# Patient Record
Sex: FEMALE | Race: WHITE | NOT HISPANIC OR LATINO | Employment: UNEMPLOYED | URBAN - METROPOLITAN AREA
[De-identification: names, ages, dates, MRNs, and addresses within clinical notes are randomized per-mention and may not be internally consistent; named-entity substitution may affect disease eponyms.]

---

## 2017-05-04 ENCOUNTER — APPOINTMENT (OUTPATIENT)
Dept: WOMENS IMAGING | Facility: HOSPITAL | Age: 42
End: 2017-05-04

## 2017-05-04 PROCEDURE — 77067 SCR MAMMO BI INCL CAD: CPT | Performed by: RADIOLOGY

## 2017-05-10 ENCOUNTER — APPOINTMENT (OUTPATIENT)
Dept: WOMENS IMAGING | Facility: HOSPITAL | Age: 42
End: 2017-05-10

## 2017-05-10 PROCEDURE — 77066 DX MAMMO INCL CAD BI: CPT | Performed by: RADIOLOGY

## 2017-11-15 ENCOUNTER — APPOINTMENT (OUTPATIENT)
Dept: WOMENS IMAGING | Facility: HOSPITAL | Age: 42
End: 2017-11-15

## 2017-11-15 PROCEDURE — 77066 DX MAMMO INCL CAD BI: CPT | Performed by: RADIOLOGY

## 2017-11-15 PROCEDURE — 77062 BREAST TOMOSYNTHESIS BI: CPT | Performed by: RADIOLOGY

## 2018-05-22 ENCOUNTER — APPOINTMENT (OUTPATIENT)
Dept: WOMENS IMAGING | Facility: HOSPITAL | Age: 43
End: 2018-05-22

## 2018-05-22 PROCEDURE — 77067 SCR MAMMO BI INCL CAD: CPT | Performed by: RADIOLOGY

## 2018-05-22 PROCEDURE — 77063 BREAST TOMOSYNTHESIS BI: CPT | Performed by: RADIOLOGY

## 2019-05-23 ENCOUNTER — APPOINTMENT (OUTPATIENT)
Dept: WOMENS IMAGING | Facility: HOSPITAL | Age: 44
End: 2019-05-23

## 2019-05-23 PROCEDURE — 77067 SCR MAMMO BI INCL CAD: CPT | Performed by: RADIOLOGY

## 2019-05-23 PROCEDURE — 77063 BREAST TOMOSYNTHESIS BI: CPT | Performed by: RADIOLOGY

## 2019-06-05 ENCOUNTER — APPOINTMENT (OUTPATIENT)
Dept: WOMENS IMAGING | Facility: HOSPITAL | Age: 44
End: 2019-06-05

## 2019-06-05 PROCEDURE — G0279 TOMOSYNTHESIS, MAMMO: HCPCS | Performed by: RADIOLOGY

## 2019-06-05 PROCEDURE — 77065 DX MAMMO INCL CAD UNI: CPT | Performed by: RADIOLOGY

## 2019-06-05 PROCEDURE — 77061 BREAST TOMOSYNTHESIS UNI: CPT | Performed by: RADIOLOGY

## 2019-06-05 PROCEDURE — 76641 ULTRASOUND BREAST COMPLETE: CPT | Performed by: RADIOLOGY

## 2019-06-12 ENCOUNTER — APPOINTMENT (OUTPATIENT)
Dept: WOMENS IMAGING | Facility: HOSPITAL | Age: 44
End: 2019-06-12

## 2019-06-12 PROCEDURE — 19081 BX BREAST 1ST LESION STRTCTC: CPT | Performed by: RADIOLOGY

## 2019-06-17 ENCOUNTER — TELEPHONE (OUTPATIENT)
Dept: SURGERY | Facility: CLINIC | Age: 44
End: 2019-06-17

## 2019-06-17 NOTE — TELEPHONE ENCOUNTER
New patient appointment with Dr. Huff is scheduled on 7/3/2019 @ 11:00am.    Called and spoke to patient, patient expressed verbal understanding of appointment time.    Sent patient a reminder letter in the mail.

## 2019-07-03 ENCOUNTER — OFFICE VISIT (OUTPATIENT)
Dept: SURGERY | Facility: CLINIC | Age: 44
End: 2019-07-03

## 2019-07-03 ENCOUNTER — PREP FOR SURGERY (OUTPATIENT)
Dept: OTHER | Facility: HOSPITAL | Age: 44
End: 2019-07-03

## 2019-07-03 VITALS
WEIGHT: 148 LBS | HEART RATE: 80 BPM | OXYGEN SATURATION: 99 % | HEIGHT: 66 IN | SYSTOLIC BLOOD PRESSURE: 106 MMHG | DIASTOLIC BLOOD PRESSURE: 68 MMHG | BODY MASS INDEX: 23.78 KG/M2

## 2019-07-03 DIAGNOSIS — R89.7 ABNORMAL BREAST BIOPSY: Primary | ICD-10-CM

## 2019-07-03 PROCEDURE — 99243 OFF/OP CNSLTJ NEW/EST LOW 30: CPT | Performed by: SURGERY

## 2019-07-03 RX ORDER — CEFAZOLIN SODIUM 2 G/100ML
2 INJECTION, SOLUTION INTRAVENOUS ONCE
Status: CANCELLED | OUTPATIENT
Start: 2019-08-19 | End: 2019-07-03

## 2019-07-03 RX ORDER — VALACYCLOVIR HYDROCHLORIDE 500 MG/1
500 TABLET, FILM COATED ORAL DAILY
COMMUNITY
Start: 2019-06-27

## 2019-07-03 NOTE — PROGRESS NOTES
[facility]     Referring Provider: Becki Zaldivar MD     Chief complaint: Abnormal left breast biopsy     HPI: Ms. Jade Reyes is a 43 yo woman, seen at the request of Dr. 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 This was initially detected as an imaging abnormality on routine screening. Her work-up is detailed in the breast history section below. She denies any associated breast lumps, pain, skin changes, or nipple discharge. She denies any prior history of breast problems or breast biopsies. She denies any family history of breast or ovarian cancer. She was joined today in clinic by her  Alvaro. She gave consent for her  to be present during her examination and participate in the discussion.       I personally reviewed her records and summarized her relevant breast history/imagin/4/17, Baseline Screening MMG ([facility]):  There are scattered areas of fibroglandular density.   1. There is a small, oval mass measuring 5 millimeters with obscured margins seen in the middle one-third upper outer region of the right breast located 5 centimeters from the nipple.  2. There is a focal asymmetry measuring 10 millimeters seen in the posterior one-third of the left breast at 9 o’clock, central located 5 centimeters from the nipple.   BI-RADS 0: Incomplete.    5/10/17, Bilateral Diagnostic MMG ([facility]):  1. On the present examination, there is a small oval mass measuring 6 millimeters with circumscribed margins in the middle one-third upper outer region of the right breast located 5 centimeters from the nipple. Mass has become more defined. This finding is most consistent with an intramammary lymph node.  2. On the present examination, there is an area of asymmetric breast tissue in the posterior one-third of the left asymmetry noted on the recent screening mammogram resolves into stroma on additional  views. No suspicious masses, or architectural distortions are identified.  BI-RADS 3: Probably Benign. Follow-up MMG in 6 months is recommended.     11/15/17, Bilateral Diagnostic MMG with Andrew ([facility]):  There are scattered areas of fibroglandular density. There are no suspicious masses, significant calcifications, or other abnormalities seen in either breast. A nonenlarged fat containing intramammary lymph node is again seen in right lateral breast (best visualized on CC views), a benign finding.  BI-RADS 2: Benign.     18, Screening MMG with Andrew ([facility]):  There are scattered areas of fibroglandular density. No suspicious masses, significant calcifications or other abnormalities are seen.   BI-RADS 1: Negative.    19, Screening MMG with Andrew ([facility]):  There are scattered areas of fibroglandular density. There is focal asymmetry measuring 15 millimeters seen in the central region of the left breast located 6 centimeters from the nipple. This is best seen on the CC view.  In the right breast, no suspicious masses, significant calcifications or other abnormalities are seen.  BI-RADS 0: Incomplete.    19, Left Diagnostic MMG with Andrew & Left Breast US ([facility]).  MM. On the present examination, there is focal asymmetry measuring 16 millimeters with associated very faint calcifications and architectural distortion in the central region of the left breast located 6 centimeters from the nipple. This persists on the additional images and requires further evaluation.  US:  1. There is no evidence of any solid mass or abnormal cystic elements. No sonographic correlate could be identified.  2. There is a small simple cyst measuring 5 millimeters seen in the sub-areolar region of the left breast.   3. There is an area of duct ectasia seen in the sub-areolar region of the left breast.   IMPRESSION:  1. Focal asymmetry in the left breast is suspicious. Stereotactic biopsy is  recommended.  2. Simple cyst in the sub-areolar region of the left breast is benign-negative.  3. Area of duct ectasia in the sub-areolar region of the left breast is benign-negative.  BI-RADS 4: Suspicious.    6/12/19, Left Breast, Stereotactic Biopsy ([facility]):   1. Breast, Left Central, Core Needle Biopsy:  Radial Sclerosing Lesion with Associated Usual Ductal Hyperplasia, Columnar Cell Lesion Without Atypia (Columnar Cell Change/Hyperplasia), Micropapillomas, Apocrine Metaplasia, and Ectatic Ducts/Microcysts.  Microcalcifications Associated with Benign Mammary Ducts/Lobules.      Review of Systems   Constitutional: Negative for appetite change, chills, diaphoresis, fatigue, fever and unexpected weight change.   HENT:   Negative for hearing loss, lump/mass, mouth sores, nosebleeds, sore throat, tinnitus, trouble swallowing and voice change.    Eyes: Negative for eye problems and icterus.   Respiratory: Negative for chest tightness, cough, hemoptysis, shortness of breath and wheezing.    Cardiovascular: Negative for chest pain, leg swelling and palpitations.   Gastrointestinal: Negative for abdominal distention, abdominal pain, blood in stool, constipation, diarrhea, nausea, rectal pain and vomiting.   Endocrine: Negative for hot flashes.   Genitourinary: Negative for bladder incontinence, difficulty urinating, dyspareunia, dysuria, frequency, hematuria, menstrual problem, nocturia, pelvic pain, vaginal bleeding and vaginal discharge.    Musculoskeletal: Negative for arthralgias, back pain, flank pain, gait problem, myalgias, neck pain and neck stiffness.   Skin: Negative for itching, rash and wound.   Neurological: Negative for dizziness, extremity weakness, gait problem, headaches, light-headedness, numbness, seizures and speech difficulty.   Hematological: Negative for adenopathy. Does not bruise/bleed easily.   Psychiatric/Behavioral: Negative for confusion, decreased concentration, depression, sleep disturbance  and suicidal ideas. The patient is not nervous/anxious.    I personally reviewed and updated the ROS.      Medications:    Current Outpatient Medications:   •  MULTIPLE VITAMIN PO, Take  by mouth., Disp: , Rfl:   •  valACYclovir (VALTREX) 500 MG tablet, , Disp: , Rfl:       Allergies   Allergen Reactions   • Benzonatate Rash and Swelling   • Latex Rash   • Sulfa Antibiotics Rash       Medical history:  None      Past Surgical History:   Procedure Laterality Date   • WISDOM TOOTH EXTRACTION         Family History   Problem Relation Age of Onset   • Melanoma Father         60's   • Melanoma Maternal Grandfather         60's   • Colon cancer Paternal Grandfather         60's       Social History     Socioeconomic History   • Marital status:      Spouse name: Alvaro   • Number of children: 2   • Years of education: Not on file   • Highest education level: Not on file   Occupational History   • Occupation:      Comment: [employer],    Tobacco Use   • Smoking status: Never Smoker   • Smokeless tobacco: Never Used   Substance and Sexual Activity   • Alcohol use: No     Frequency: Never   • Drug use: No   Social History Narrative    1 daughter, 1 son     Patient drinks 2 servings of caffeine per day.       GYNECOLOGIC HISTORY:   . P: 2. AB: 0.  Last menstrual period: 19  Age at menarche: 15  Age at first childbirth: 31  Lactation/How lon month  Age at menopause: premenopausal  Total years of oral contraceptive use: 6  Total years of hormone replacement therapy: none      Physical Exam  Vitals:    19 1105   BP: 106/68   Pulse: 80   SpO2: 99%     ECOG 0 - Asymptomatic  General: NAD, well appearing  Psych: a&o x 3, normal mood and affect  Eyes: EOMI, no scleral icterus  ENMT: neck supple without masses or thyromegaly, mucus membranes moist  Resp: normal effort, CTAB  CV: RRR, no murmurs, no edema   GI: soft, NT, ND  MSK: normal gait, normal ROM in bilateral shoulders  Lymph nodes:  no cervical, supraclavicular or axillary lymphadenopathy  Breast: symmetric, large size, grade 2 ptosis  Right: No visible abnormalities on inspection while seated, with arms raised or hands on hips. No masses, skin changes, or nipple abnormalities.  Left: There is a medial biopsy site, otherwise no visible abnormalities on inspection while seated, with arms raised or hands on hips. No masses or nipple abnormalities.    Left breast, in-office ultrasound: At 9:30, 5 cm FN, there are vague postbiopsy changes with a small resolving postbiopsy hematoma seen adjacent to the pectoralis muscle.    I have independently reviewed her imaging and here are my findings:   In the medial/central left breast, there was initially a 1.6 cm asymmetry/area of distortion seen on mammogram.      Assessment:  44 y.o. 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    Discussion:  We reviewed her pathology and imaging reports. We discussed that the finding of a complex sclerosing lesion on core biopsy is associated with an approximately 15-20% risk of identifying atypia, DCIS, or invasive carcinoma within the vicinity of the lesion. Similarly, a papilloma is associated with an approximately 15% risk of upgrade. This cannot be recognized on the small volume of tissue obtained at percutaneous biopsy. Therefore excisional biopsy with more generous tissue sampling and pathologic analysis is recommended. She is in agreement with this plan.     We discussed that excisional biopsy would require pre-operative wire-localization, as this was a mammographically detected lesion. We discussed that should the final pathology be upgraded, she would likely require an additional operation. We reviewed additional risks and potential complications associated with surgery, including, but not limited to, bleeding, infection, deformity or poor cosmetic result, chronic pain, numbness, seroma, hematoma, loss of nipple  sensation, nipple retraction, deep venous thrombosis, and skin flap necrosis. We reviewed postoperative wound care and activity restrictions. She expressed an understanding of these factors and wished to proceed.    Plan:  -Left needle-localized excisional biopsy. She would like to schedule this at the end of August.    Salome Huff MD    I have spent 45 min in face to face time with the patient and 35 min of this time was spent in counseling on the above stated issues.       CC:  MD Anjelica Mosqueda MD Mollie Cartwright, MD

## 2019-07-05 ENCOUNTER — TRANSCRIBE ORDERS (OUTPATIENT)
Dept: SURGERY | Facility: CLINIC | Age: 44
End: 2019-07-05

## 2019-07-05 DIAGNOSIS — R89.7 NONSPECIFIC ABNORMAL HISTOLOGICAL FINDINGS: Primary | ICD-10-CM

## 2019-07-08 ENCOUNTER — TELEPHONE (OUTPATIENT)
Dept: SURGERY | Facility: CLINIC | Age: 44
End: 2019-07-08

## 2019-07-08 NOTE — TELEPHONE ENCOUNTER
Surgery is scheduled on 8/19/2019 @ 12:30pm, NL @ 11:00, arrival 9:00am.    PAT is scheduled on 8/12/2019 @ 8:30am.    Post-op appointment is scheduled on 8/30/2019 @ 10:30am.    Called and spoke to patient, patient expressed verbal understanding of appointment times.    Sent patient a reminder letter in the mail.

## 2019-08-12 ENCOUNTER — APPOINTMENT (OUTPATIENT)
Dept: PREADMISSION TESTING | Facility: HOSPITAL | Age: 44
End: 2019-08-12

## 2019-08-12 VITALS
BODY MASS INDEX: 23.65 KG/M2 | RESPIRATION RATE: 16 BRPM | HEART RATE: 70 BPM | HEIGHT: 67 IN | SYSTOLIC BLOOD PRESSURE: 108 MMHG | WEIGHT: 150.7 LBS | DIASTOLIC BLOOD PRESSURE: 72 MMHG | TEMPERATURE: 98.8 F | OXYGEN SATURATION: 100 %

## 2019-08-12 DIAGNOSIS — R89.7 ABNORMAL BREAST BIOPSY: ICD-10-CM

## 2019-08-12 LAB
ANION GAP SERPL CALCULATED.3IONS-SCNC: 8.5 MMOL/L (ref 5–15)
BASOPHILS # BLD AUTO: 0.05 10*3/MM3 (ref 0–0.2)
BASOPHILS NFR BLD AUTO: 1 % (ref 0–1.5)
BUN BLD-MCNC: 10 MG/DL (ref 6–20)
BUN/CREAT SERPL: 13.3 (ref 7–25)
CALCIUM SPEC-SCNC: 8.8 MG/DL (ref 8.6–10.5)
CHLORIDE SERPL-SCNC: 100 MMOL/L (ref 98–107)
CO2 SERPL-SCNC: 25.5 MMOL/L (ref 22–29)
CREAT BLD-MCNC: 0.75 MG/DL (ref 0.57–1)
DEPRECATED RDW RBC AUTO: 42.1 FL (ref 37–54)
EOSINOPHIL # BLD AUTO: 0 10*3/MM3 (ref 0–0.4)
EOSINOPHIL NFR BLD AUTO: 0 % (ref 0.3–6.2)
ERYTHROCYTE [DISTWIDTH] IN BLOOD BY AUTOMATED COUNT: 12.6 % (ref 12.3–15.4)
GFR SERPL CREATININE-BSD FRML MDRD: 85 ML/MIN/1.73
GLUCOSE BLD-MCNC: 87 MG/DL (ref 65–99)
HCT VFR BLD AUTO: 39 % (ref 34–46.6)
HGB BLD-MCNC: 12.3 G/DL (ref 12–15.9)
IMM GRANULOCYTES # BLD AUTO: 0 10*3/MM3 (ref 0–0.05)
IMM GRANULOCYTES NFR BLD AUTO: 0 % (ref 0–0.5)
LYMPHOCYTES # BLD AUTO: 1.55 10*3/MM3 (ref 0.7–3.1)
LYMPHOCYTES NFR BLD AUTO: 31.9 % (ref 19.6–45.3)
MCH RBC QN AUTO: 28.5 PG (ref 26.6–33)
MCHC RBC AUTO-ENTMCNC: 31.5 G/DL (ref 31.5–35.7)
MCV RBC AUTO: 90.5 FL (ref 79–97)
MONOCYTES # BLD AUTO: 0.58 10*3/MM3 (ref 0.1–0.9)
MONOCYTES NFR BLD AUTO: 11.9 % (ref 5–12)
NEUTROPHILS # BLD AUTO: 2.68 10*3/MM3 (ref 1.7–7)
NEUTROPHILS NFR BLD AUTO: 55.2 % (ref 42.7–76)
NRBC BLD AUTO-RTO: 0 /100 WBC (ref 0–0.2)
PLATELET # BLD AUTO: 199 10*3/MM3 (ref 140–450)
PMV BLD AUTO: 9.5 FL (ref 6–12)
POTASSIUM BLD-SCNC: 3.8 MMOL/L (ref 3.5–5.2)
RBC # BLD AUTO: 4.31 10*6/MM3 (ref 3.77–5.28)
SODIUM BLD-SCNC: 134 MMOL/L (ref 136–145)
WBC NRBC COR # BLD: 4.86 10*3/MM3 (ref 3.4–10.8)

## 2019-08-12 PROCEDURE — 85025 COMPLETE CBC W/AUTO DIFF WBC: CPT | Performed by: SURGERY

## 2019-08-12 PROCEDURE — 93005 ELECTROCARDIOGRAM TRACING: CPT

## 2019-08-12 PROCEDURE — 36415 COLL VENOUS BLD VENIPUNCTURE: CPT

## 2019-08-12 PROCEDURE — 80048 BASIC METABOLIC PNL TOTAL CA: CPT | Performed by: SURGERY

## 2019-08-12 PROCEDURE — 93010 ELECTROCARDIOGRAM REPORT: CPT | Performed by: INTERNAL MEDICINE

## 2019-08-12 NOTE — DISCHARGE INSTRUCTIONS
Take the following medications the morning of surgery with a small sip of water:  NONE      Arrive to hospital on your day of surgery at 9:00 AM.        General Instructions:  • Do not eat solid food after midnight the night before surgery.  • You may drink clear liquids day of surgery but must stop at least one hour before your hospital arrival time.  • It is beneficial for you to have a clear drink that contains carbohydrates the day of surgery.  We suggest a 12 to 20 ounce bottle of Gatorade or Powerade for non-diabetic patients or a 12 to 20 ounce bottle of G2 or Powerade Zero for diabetic patients. (Pediatric patients, are not advised to drink a 12 to 20 ounce carbohydrate drink)    Clear liquids are liquids you can see through.  Nothing red in color.     Plain water                               Sports drinks  Sodas                                   Gelatin (Jell-O)  Fruit juices without pulp such as white grape juice and apple juice  Popsicles that contain no fruit or yogurt  Tea or coffee (no cream or milk added)  Gatorade / Powerade  G2 / Powerade Zero    • Infants may have breast milk up to four hours before surgery.  • Infants drinking formula may drink formula up to six hours before surgery.   • Patients who avoid smoking, chewing tobacco and alcohol for 4 weeks prior to surgery have a reduced risk of post-operative complications.  Quit smoking as many days before surgery as you can.  • Do not smoke, use chewing tobacco or drink alcohol the day of surgery.   • If applicable bring your C-PAP/ BI-PAP machine.  • Bring any papers given to you in the doctor’s office.  • Wear clean comfortable clothes and socks.  • Do not wear contact lenses, false eyelashes or make-up.  Bring a case for your glasses.   • Bring crutches or walker if applicable.  • Remove all piercings.  Leave jewelry and any other valuables at home.  • Hair extensions with metal clips must be removed prior to surgery.  • The Pre-Admission  Testing nurse will instruct you to bring medications if unable to obtain an accurate list in Pre-Admission Testing.        If you were given a blood bank ID arm band remember to bring it with you the day of surgery.    Preventing a Surgical Site Infection:  • For 2 to 3 days before surgery, avoid shaving with a razor because the razor can irritate skin and make it easier to develop an infection.    • Any areas of open skin can increase the risk of a post-operative wound infection by allowing bacteria to enter and travel throughout the body.  Notify your surgeon if you have any skin wounds / rashes even if it is not near the expected surgical site.  The area will need assessed to determine if surgery should be delayed until it is healed.  • The night prior to surgery sleep in a clean bed with clean clothing.  Do not allow pets to sleep with you.  • Shower on the morning of surgery using a fresh bar of anti-bacterial soap (such as Dial) and clean washcloth.  Dry with a clean towel and dress in clean clothing.  • Ask your surgeon if you will be receiving antibiotics prior to surgery.  • Make sure you, your family, and all healthcare providers clean their hands with soap and water or an alcohol based hand  before caring for you or your wound.    Day of surgery:  Upon arrival, a Pre-op nurse and Anesthesiologist will review your health history, obtain vital signs, and answer questions you may have.  The only belongings needed at this time will be a list of your home medications and if applicable your C-PAP/BI-PAP machine.  If you are staying overnight your family can leave the rest of your belongings in the car and bring them to your room later.  A Pre-op nurse will start an IV and you may receive medication in preparation for surgery, including something to help you relax.  Your family will be able to see you in the Pre-op area.  While you are in surgery your family should notify the waiting room   if they leave the waiting room area and provide a contact phone number.    Please be aware that surgery does come with discomfort.  We want to make every effort to control your discomfort so please discuss any uncontrolled symptoms with your nurse.   Your doctor will most likely have prescribed pain medications.      If you are going home after surgery you will receive individualized written care instructions before being discharged.  A responsible adult must drive you to and from the hospital on the day of your surgery and stay with you for 24 hours.    If you are staying overnight following surgery, you will be transported to your hospital room following the recovery period.  [facility] has all private rooms.    You have received a list of surgical assistants for your reference.  If you have any questions please call Pre-Admission Testing at [phone].  Deductibles and co-payments are collected on the day of service. Please be prepared to pay the required co-pay, deductible or deposit on the day of service as defined by your plan.

## 2019-08-19 ENCOUNTER — HOSPITAL ENCOUNTER (OUTPATIENT)
Dept: MAMMOGRAPHY | Facility: HOSPITAL | Age: 44
Discharge: HOME | End: 2019-08-19

## 2019-08-19 ENCOUNTER — ANESTHESIA EVENT (OUTPATIENT)
Dept: PERIOP | Facility: HOSPITAL | Age: 44
End: 2019-08-19

## 2019-08-19 ENCOUNTER — ANESTHESIA (OUTPATIENT)
Dept: PERIOP | Facility: HOSPITAL | Age: 44
End: 2019-08-19

## 2019-08-19 ENCOUNTER — APPOINTMENT (OUTPATIENT)
Dept: GENERAL RADIOLOGY | Facility: HOSPITAL | Age: 44
End: 2019-08-19

## 2019-08-19 ENCOUNTER — HOSPITAL ENCOUNTER (OUTPATIENT)
Facility: HOSPITAL | Age: 44
Setting detail: HOSPITAL OUTPATIENT SURGERY
Discharge: HOME | End: 2019-08-19
Attending: SURGERY | Admitting: SURGERY

## 2019-08-19 VITALS
BODY MASS INDEX: 23.41 KG/M2 | SYSTOLIC BLOOD PRESSURE: 113 MMHG | OXYGEN SATURATION: 99 % | RESPIRATION RATE: 16 BRPM | WEIGHT: 147.27 LBS | TEMPERATURE: 97.8 F | DIASTOLIC BLOOD PRESSURE: 72 MMHG | HEART RATE: 64 BPM

## 2019-08-19 DIAGNOSIS — R89.7 NONSPECIFIC ABNORMAL HISTOLOGICAL FINDINGS: ICD-10-CM

## 2019-08-19 DIAGNOSIS — R89.7 ABNORMAL BREAST BIOPSY: ICD-10-CM

## 2019-08-19 LAB
B-HCG UR QL: NEGATIVE
INTERNAL NEGATIVE CONTROL: NEGATIVE
INTERNAL POSITIVE CONTROL: POSITIVE
Lab: NORMAL

## 2019-08-19 PROCEDURE — 63710000001 PROMETHAZINE PER 25 MG: Performed by: NURSE ANESTHETIST, CERTIFIED REGISTERED

## 2019-08-19 PROCEDURE — 25010000002 FENTANYL CITRATE (PF) 100 MCG/2ML SOLUTION: Performed by: NURSE ANESTHETIST, CERTIFIED REGISTERED

## 2019-08-19 PROCEDURE — 25010000002 DIPHENHYDRAMINE PER 50 MG: Performed by: NURSE ANESTHETIST, CERTIFIED REGISTERED

## 2019-08-19 PROCEDURE — 25010000002 ONDANSETRON PER 1 MG: Performed by: NURSE ANESTHETIST, CERTIFIED REGISTERED

## 2019-08-19 PROCEDURE — 88307 TISSUE EXAM BY PATHOLOGIST: CPT | Performed by: SURGERY

## 2019-08-19 PROCEDURE — 25010000002 HYDROMORPHONE PER 4 MG: Performed by: NURSE ANESTHETIST, CERTIFIED REGISTERED

## 2019-08-19 PROCEDURE — 25010000003 LIDOCAINE 1 % SOLUTION: Performed by: SURGERY

## 2019-08-19 PROCEDURE — 76998 US GUIDE INTRAOP: CPT | Performed by: SURGERY

## 2019-08-19 PROCEDURE — 25010000002 DEXAMETHASONE PER 1 MG: Performed by: NURSE ANESTHETIST, CERTIFIED REGISTERED

## 2019-08-19 PROCEDURE — 25010000003 CEFAZOLIN IN DEXTROSE 2-4 GM/100ML-% SOLUTION: Performed by: SURGERY

## 2019-08-19 PROCEDURE — 76098 X-RAY EXAM SURGICAL SPECIMEN: CPT

## 2019-08-19 PROCEDURE — 81025 URINE PREGNANCY TEST: CPT | Performed by: ANESTHESIOLOGY

## 2019-08-19 PROCEDURE — 19125 EXCISION BREAST LESION: CPT | Performed by: SURGERY

## 2019-08-19 PROCEDURE — 25010000002 PROPOFOL 10 MG/ML EMULSION: Performed by: NURSE ANESTHETIST, CERTIFIED REGISTERED

## 2019-08-19 PROCEDURE — S0260 H&P FOR SURGERY: HCPCS | Performed by: SURGERY

## 2019-08-19 RX ORDER — ONDANSETRON 2 MG/ML
4 INJECTION INTRAMUSCULAR; INTRAVENOUS ONCE AS NEEDED
Status: DISCONTINUED | OUTPATIENT
Start: 2019-08-19 | End: 2019-08-19 | Stop reason: HOSPADM

## 2019-08-19 RX ORDER — ACETAMINOPHEN 500 MG
1000 TABLET ORAL EVERY 8 HOURS
Qty: 30 TABLET | Refills: 0 | Status: SHIPPED | OUTPATIENT
Start: 2019-08-19 | End: 2019-08-24

## 2019-08-19 RX ORDER — CEFAZOLIN SODIUM 2 G/100ML
2 INJECTION, SOLUTION INTRAVENOUS ONCE
Status: COMPLETED | OUTPATIENT
Start: 2019-08-19 | End: 2019-08-19

## 2019-08-19 RX ORDER — DEXAMETHASONE SODIUM PHOSPHATE 10 MG/ML
INJECTION INTRAMUSCULAR; INTRAVENOUS AS NEEDED
Status: DISCONTINUED | OUTPATIENT
Start: 2019-08-19 | End: 2019-08-19 | Stop reason: SURG

## 2019-08-19 RX ORDER — ONDANSETRON 2 MG/ML
INJECTION INTRAMUSCULAR; INTRAVENOUS AS NEEDED
Status: DISCONTINUED | OUTPATIENT
Start: 2019-08-19 | End: 2019-08-19 | Stop reason: SURG

## 2019-08-19 RX ORDER — BUPIVACAINE HYDROCHLORIDE AND EPINEPHRINE 2.5; 5 MG/ML; UG/ML
INJECTION, SOLUTION INFILTRATION; PERINEURAL AS NEEDED
Status: DISCONTINUED | OUTPATIENT
Start: 2019-08-19 | End: 2019-08-19 | Stop reason: HOSPADM

## 2019-08-19 RX ORDER — ACETAMINOPHEN 325 MG/1
650 TABLET ORAL ONCE AS NEEDED
Status: DISCONTINUED | OUTPATIENT
Start: 2019-08-19 | End: 2019-08-19 | Stop reason: HOSPADM

## 2019-08-19 RX ORDER — FAMOTIDINE 10 MG/ML
20 INJECTION, SOLUTION INTRAVENOUS ONCE
Status: COMPLETED | OUTPATIENT
Start: 2019-08-19 | End: 2019-08-19

## 2019-08-19 RX ORDER — PROMETHAZINE HYDROCHLORIDE 25 MG/1
25 TABLET ORAL ONCE AS NEEDED
Status: DISCONTINUED | OUTPATIENT
Start: 2019-08-19 | End: 2019-08-19 | Stop reason: HOSPADM

## 2019-08-19 RX ORDER — NALOXONE HCL 0.4 MG/ML
0.2 VIAL (ML) INJECTION AS NEEDED
Status: DISCONTINUED | OUTPATIENT
Start: 2019-08-19 | End: 2019-08-19 | Stop reason: HOSPADM

## 2019-08-19 RX ORDER — DIPHENHYDRAMINE HCL 25 MG
25 CAPSULE ORAL EVERY 6 HOURS PRN
Status: DISCONTINUED | OUTPATIENT
Start: 2019-08-19 | End: 2019-08-19 | Stop reason: HOSPADM

## 2019-08-19 RX ORDER — ACETAMINOPHEN 650 MG/1
650 SUPPOSITORY RECTAL ONCE AS NEEDED
Status: DISCONTINUED | OUTPATIENT
Start: 2019-08-19 | End: 2019-08-19 | Stop reason: HOSPADM

## 2019-08-19 RX ORDER — FENTANYL CITRATE 50 UG/ML
INJECTION, SOLUTION INTRAMUSCULAR; INTRAVENOUS AS NEEDED
Status: DISCONTINUED | OUTPATIENT
Start: 2019-08-19 | End: 2019-08-19 | Stop reason: SURG

## 2019-08-19 RX ORDER — PROMETHAZINE HYDROCHLORIDE 25 MG/ML
6.25 INJECTION, SOLUTION INTRAMUSCULAR; INTRAVENOUS
Status: DISCONTINUED | OUTPATIENT
Start: 2019-08-19 | End: 2019-08-19 | Stop reason: HOSPADM

## 2019-08-19 RX ORDER — HYDROCODONE BITARTRATE AND ACETAMINOPHEN 7.5; 325 MG/1; MG/1
1 TABLET ORAL ONCE AS NEEDED
Status: DISCONTINUED | OUTPATIENT
Start: 2019-08-19 | End: 2019-08-19 | Stop reason: HOSPADM

## 2019-08-19 RX ORDER — EPHEDRINE SULFATE 50 MG/ML
5 INJECTION, SOLUTION INTRAVENOUS ONCE AS NEEDED
Status: DISCONTINUED | OUTPATIENT
Start: 2019-08-19 | End: 2019-08-19 | Stop reason: HOSPADM

## 2019-08-19 RX ORDER — FLUMAZENIL 0.1 MG/ML
0.2 INJECTION INTRAVENOUS AS NEEDED
Status: DISCONTINUED | OUTPATIENT
Start: 2019-08-19 | End: 2019-08-19 | Stop reason: HOSPADM

## 2019-08-19 RX ORDER — ALBUTEROL SULFATE 2.5 MG/3ML
2.5 SOLUTION RESPIRATORY (INHALATION) ONCE AS NEEDED
Status: DISCONTINUED | OUTPATIENT
Start: 2019-08-19 | End: 2019-08-19 | Stop reason: HOSPADM

## 2019-08-19 RX ORDER — LIDOCAINE HYDROCHLORIDE 10 MG/ML
0.5 INJECTION, SOLUTION EPIDURAL; INFILTRATION; INTRACAUDAL; PERINEURAL ONCE AS NEEDED
Status: DISCONTINUED | OUTPATIENT
Start: 2019-08-19 | End: 2019-08-19 | Stop reason: HOSPADM

## 2019-08-19 RX ORDER — HYDROMORPHONE HYDROCHLORIDE 1 MG/ML
0.5 INJECTION, SOLUTION INTRAMUSCULAR; INTRAVENOUS; SUBCUTANEOUS
Status: DISCONTINUED | OUTPATIENT
Start: 2019-08-19 | End: 2019-08-19 | Stop reason: HOSPADM

## 2019-08-19 RX ORDER — MAGNESIUM HYDROXIDE 1200 MG/15ML
LIQUID ORAL AS NEEDED
Status: DISCONTINUED | OUTPATIENT
Start: 2019-08-19 | End: 2019-08-19 | Stop reason: HOSPADM

## 2019-08-19 RX ORDER — DIPHENHYDRAMINE HYDROCHLORIDE 50 MG/ML
INJECTION INTRAMUSCULAR; INTRAVENOUS AS NEEDED
Status: DISCONTINUED | OUTPATIENT
Start: 2019-08-19 | End: 2019-08-19 | Stop reason: SURG

## 2019-08-19 RX ORDER — PROMETHAZINE HYDROCHLORIDE 25 MG/1
25 SUPPOSITORY RECTAL ONCE AS NEEDED
Status: DISCONTINUED | OUTPATIENT
Start: 2019-08-19 | End: 2019-08-19 | Stop reason: HOSPADM

## 2019-08-19 RX ORDER — LIDOCAINE HYDROCHLORIDE 20 MG/ML
INJECTION, SOLUTION INFILTRATION; PERINEURAL AS NEEDED
Status: DISCONTINUED | OUTPATIENT
Start: 2019-08-19 | End: 2019-08-19 | Stop reason: SURG

## 2019-08-19 RX ORDER — HYDRALAZINE HYDROCHLORIDE 20 MG/ML
5 INJECTION INTRAMUSCULAR; INTRAVENOUS
Status: DISCONTINUED | OUTPATIENT
Start: 2019-08-19 | End: 2019-08-19 | Stop reason: HOSPADM

## 2019-08-19 RX ORDER — OXYCODONE AND ACETAMINOPHEN 7.5; 325 MG/1; MG/1
1 TABLET ORAL ONCE AS NEEDED
Status: COMPLETED | OUTPATIENT
Start: 2019-08-19 | End: 2019-08-19

## 2019-08-19 RX ORDER — PROPOFOL 10 MG/ML
VIAL (ML) INTRAVENOUS AS NEEDED
Status: DISCONTINUED | OUTPATIENT
Start: 2019-08-19 | End: 2019-08-19 | Stop reason: SURG

## 2019-08-19 RX ORDER — DIAZEPAM 5 MG/1
5 TABLET ORAL ONCE AS NEEDED
Status: COMPLETED | OUTPATIENT
Start: 2019-08-19 | End: 2019-08-19

## 2019-08-19 RX ORDER — OXYCODONE HYDROCHLORIDE 5 MG/1
5 TABLET ORAL EVERY 4 HOURS PRN
Qty: 10 TABLET | Refills: 0 | Status: SHIPPED | OUTPATIENT
Start: 2019-08-19 | End: 2019-08-29

## 2019-08-19 RX ORDER — FENTANYL CITRATE 50 UG/ML
50 INJECTION, SOLUTION INTRAMUSCULAR; INTRAVENOUS
Status: DISCONTINUED | OUTPATIENT
Start: 2019-08-19 | End: 2019-08-19 | Stop reason: HOSPADM

## 2019-08-19 RX ORDER — SODIUM CHLORIDE, SODIUM LACTATE, POTASSIUM CHLORIDE, CALCIUM CHLORIDE 600; 310; 30; 20 MG/100ML; MG/100ML; MG/100ML; MG/100ML
9 INJECTION, SOLUTION INTRAVENOUS CONTINUOUS
Status: DISCONTINUED | OUTPATIENT
Start: 2019-08-19 | End: 2019-08-19 | Stop reason: HOSPADM

## 2019-08-19 RX ORDER — PROMETHAZINE HYDROCHLORIDE 25 MG/ML
12.5 INJECTION, SOLUTION INTRAMUSCULAR; INTRAVENOUS ONCE AS NEEDED
Status: DISCONTINUED | OUTPATIENT
Start: 2019-08-19 | End: 2019-08-19 | Stop reason: HOSPADM

## 2019-08-19 RX ORDER — SODIUM CHLORIDE, SODIUM LACTATE, POTASSIUM CHLORIDE, CALCIUM CHLORIDE 600; 310; 30; 20 MG/100ML; MG/100ML; MG/100ML; MG/100ML
100 INJECTION, SOLUTION INTRAVENOUS CONTINUOUS
Status: DISCONTINUED | OUTPATIENT
Start: 2019-08-19 | End: 2019-08-19 | Stop reason: HOSPADM

## 2019-08-19 RX ORDER — DIPHENHYDRAMINE HCL 25 MG
25 CAPSULE ORAL
Status: DISCONTINUED | OUTPATIENT
Start: 2019-08-19 | End: 2019-08-19 | Stop reason: HOSPADM

## 2019-08-19 RX ORDER — LIDOCAINE HYDROCHLORIDE 10 MG/ML
3 INJECTION, SOLUTION INFILTRATION; PERINEURAL ONCE
Status: COMPLETED | OUTPATIENT
Start: 2019-08-19 | End: 2019-08-19

## 2019-08-19 RX ORDER — SODIUM CHLORIDE 0.9 % (FLUSH) 0.9 %
1-10 SYRINGE (ML) INJECTION AS NEEDED
Status: DISCONTINUED | OUTPATIENT
Start: 2019-08-19 | End: 2019-08-19 | Stop reason: HOSPADM

## 2019-08-19 RX ADMIN — FAMOTIDINE 20 MG: 10 INJECTION INTRAVENOUS at 11:54

## 2019-08-19 RX ADMIN — DIAZEPAM 5 MG: 5 TABLET ORAL at 09:49

## 2019-08-19 RX ADMIN — SODIUM CHLORIDE, POTASSIUM CHLORIDE, SODIUM LACTATE AND CALCIUM CHLORIDE 100 ML/HR: 600; 310; 30; 20 INJECTION, SOLUTION INTRAVENOUS at 14:06

## 2019-08-19 RX ADMIN — DIPHENHYDRAMINE HYDROCHLORIDE 25 MG: 50 INJECTION INTRAMUSCULAR; INTRAVENOUS at 12:33

## 2019-08-19 RX ADMIN — OXYCODONE HYDROCHLORIDE AND ACETAMINOPHEN 1 TABLET: 7.5; 325 TABLET ORAL at 14:05

## 2019-08-19 RX ADMIN — CEFAZOLIN SODIUM 2 G: 2 INJECTION, SOLUTION INTRAVENOUS at 12:23

## 2019-08-19 RX ADMIN — ONDANSETRON 4 MG: 2 INJECTION INTRAMUSCULAR; INTRAVENOUS at 13:30

## 2019-08-19 RX ADMIN — LIDOCAINE HYDROCHLORIDE 3 ML: 10 INJECTION, SOLUTION INFILTRATION; PERINEURAL at 11:20

## 2019-08-19 RX ADMIN — FENTANYL CITRATE 100 MCG: 50 INJECTION INTRAMUSCULAR; INTRAVENOUS at 12:32

## 2019-08-19 RX ADMIN — PROPOFOL 200 MG: 10 INJECTION, EMULSION INTRAVENOUS at 12:32

## 2019-08-19 RX ADMIN — PROMETHAZINE HYDROCHLORIDE 25 MG: 25 TABLET ORAL at 14:05

## 2019-08-19 RX ADMIN — DEXAMETHASONE SODIUM PHOSPHATE 8 MG: 10 INJECTION INTRAMUSCULAR; INTRAVENOUS at 12:36

## 2019-08-19 RX ADMIN — HYDROMORPHONE HYDROCHLORIDE 0.5 MG: 1 INJECTION, SOLUTION INTRAMUSCULAR; INTRAVENOUS; SUBCUTANEOUS at 14:05

## 2019-08-19 RX ADMIN — FENTANYL CITRATE 50 MCG: 50 INJECTION INTRAMUSCULAR; INTRAVENOUS at 13:45

## 2019-08-19 RX ADMIN — SODIUM CHLORIDE, POTASSIUM CHLORIDE, SODIUM LACTATE AND CALCIUM CHLORIDE 9 ML/HR: 600; 310; 30; 20 INJECTION, SOLUTION INTRAVENOUS at 11:54

## 2019-08-19 RX ADMIN — LIDOCAINE HYDROCHLORIDE 80 MG: 20 INJECTION, SOLUTION INFILTRATION; PERINEURAL at 12:32

## 2019-08-19 NOTE — OP NOTE
Operative Report    Patient Name:  Jade Reeys  YOB: 1975    Date of Surgery:  8/19/2019     Pre-op Diagnosis:   Abnormal breast biopsy [R89.7]       Post-Op Diagnosis:  Abnormal breast biopsy [R89.7]    Procedure:  Left breast needle-localized excisional biopsy    Staff:  Surgeon(s):  Salome Huff MD       Anesthesia: General    Estimated Blood Loss: 5 mL    Implants:  None    Specimen:          Specimens     ID Source Type Tests Collected By Collected At Frozen?      A Breast, Left Tissue · TISSUE PATHOLOGY EXAM   Salome Huff MD 8/19/19 9140 No     Description: LEFT BREAST EXCISIONAL BIOPSY, SHORT STITCH SUPERIOR, LONG STITCH LATERAL    Comment: FRESH FOR PERM    B Breast, Left Tissue · TISSUE PATHOLOGY EXAM   Salome Huff MD 8/19/19 0793      Description: LEFT BREAST ADDITIONAL MEDIAL MARGIN, STITCH MARKS MARGIN          Findings: Wire and clip seen within specimen radiograph.     Complications: None    Indications: The patient is a 44 y.o. 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 She presents today for excisional biopsy. This required preoperative wire-localization. The risks and benefits were discussed preoperatively and she understood and agreed to proceed.     Description of Procedure:   Preoperatively, the patient was taken to the radiology department and the lesion was localized with a needle/wire. I reviewed the post-localization mammogram to determine the trajectory of the wire. The patient was identified in the preoperative area and brought to the operating room and placed supine on the table. Patient verification was performed and general anesthesia was induced. The patient was prepped and draped in sterile fashion with the wire/needle prepped into the field. A time out was performed and all were in agreement. I confirmed that the patient had received preoperative antibiotics.       Intraoperative ultrasound was  used to identify the needle & wire. On the skin, I marked the suggested location of the lesion based on both the ultrasound and mammographic localization imaging. I used this to plan my intended area of resection and incision, and marked these as well. The skin was infiltrated with local anesthesia. An upper inner periareolar incision was made with a scalpel and carried down through the dermis with sharp dissection. Flaps were raised according to the planned dissection. An anterior flap was raised first. Dissection was then carried out superiorly, inferiorly, medially, and laterally. The wire was delivered into the wound. The posterior dissection was completed and the specimen removed. The specimen was oriented with a short stitch superiorly and a long stitch laterally. Specimen radiograph showed the wire and clip near the medial side of the biopsy. Given this, an additional medial margin was taken. This was oriented with a stitch at the true margin.     The breast cavity was irrigated and hemostasis achieved. The remaining local anesthesia was infiltrated into the breast cavity. The breast parenchyma was brought together with 3-0 vicryl stitches. The deep dermis was closed with interrupted 3-0 vicryl suture. The skin was closed with 4-0 subcuticular running monocryl and covered with dermabond. Counts were correct at the end of the case. The patient was awakened from anesthesia and taken to the PACU in stable condition.    Salome Huff MD     Date: 8/19/2019  Time: 2:01 PM

## 2019-08-19 NOTE — ANESTHESIA PROCEDURE NOTES
Airway  Urgency: elective    Airway not difficult    General Information and Staff    Patient location during procedure: OR  Anesthesiologist: Duncan Rivero MD  CRNA: Debra Pena CRNA    Indications and Patient Condition  Indications for airway management: airway protection    Preoxygenated: yes  Mask difficulty assessment: 1 - vent by mask    Final Airway Details  Final airway type: supraglottic airway      Successful airway: classic  Size 3    Number of attempts at approach: 1    Additional Comments  PreO2, IV induction, easy mask, LMA placed w/o difficulty, secured in placed, EBBSH, +etCO2, atraumatic, teeth and lips as preop.

## 2019-08-19 NOTE — PERIOPERATIVE NURSING NOTE
Iv attempts x 2, iv therapy paged   Problem: Patient Care Overview  Goal: Plan of Care/Patient Progress Review  Discharge Planner OT   Patient plan for discharge: not addressed   Current status: OT evaluation completed and treatment initiated. Pt needing frequent re-education on abdominal precautions. Pt with cognitive deficits, pt with impaired sequencing, divided attention, safety awareness, and memory. Pt min A for bed mobility and mod A for LB dressing, pt attempting to mehdi both socks on the same foot. Pt CGA with FWW for mobility in the room. Pt completed g/h tasks at sink alternating standing/sitting. Pt fatigued quickly on 5LPM oxymask, SpO2 >93% HR 90s.   Barriers to return to prior living situation: cognitive impairments, balance deficits, deconditioning   Recommendations for discharge: TCU   Rationale for recommendations: Pt would benefit from continued therapies to increase independence and safety with ADLs prior to discharge home.        Entered by: Cammie Waters 02/27/2018 11:46 AM

## 2019-08-19 NOTE — H&P
BREAST SURGERY HISTORY & PHYSICAL        Chief complaint: Abnormal left breast biopsy     HPI:   7/3/19:  Ms. Jade Reyes is a 45 yo woman, seen at the request of Dr. 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 This was initially detected as an imaging abnormality on routine screening. Her work-up is detailed in the breast history section below. She denies any associated breast lumps, pain, skin changes, or nipple discharge. She denies any prior history of breast problems or breast biopsies. She denies any family history of breast or ovarian cancer.     19, Interval history:  She presents today for surgery. She denies any new complaints.       I personally reviewed her records and summarized her relevant breast history/imagin/4/17, Baseline Screening MMG ([facility]):  There are scattered areas of fibroglandular density.   1. There is a small, oval mass measuring 5 millimeters with obscured margins seen in the middle one-third upper outer region of the right breast located 5 centimeters from the nipple.  2. There is a focal asymmetry measuring 10 millimeters seen in the posterior one-third of the left breast at 9 o’clock, central located 5 centimeters from the nipple.   BI-RADS 0: Incomplete.     5/10/17, Bilateral Diagnostic MMG ([facility]):  1. On the present examination, there is a small oval mass measuring 6 millimeters with circumscribed margins in the middle one-third upper outer region of the right breast located 5 centimeters from the nipple. Mass has become more defined. This finding is most consistent with an intramammary lymph node.  2. On the present examination, there is an area of asymmetric breast tissue in the posterior one-third of the left asymmetry noted on the recent screening mammogram resolves into stroma on additional views. No suspicious masses, or architectural distortions are  identified.  BI-RADS 3: Probably Benign. Follow-up MMG in 6 months is recommended.      11/15/17, Bilateral Diagnostic MMG with Andrew ([facility]):  There are scattered areas of fibroglandular density. There are no suspicious masses, significant calcifications, or other abnormalities seen in either breast. A nonenlarged fat containing intramammary lymph node is again seen in right lateral breast (best visualized on CC views), a benign finding.  BI-RADS 2: Benign.      18, Screening MMG with Andrew ([facility]):  There are scattered areas of fibroglandular density. No suspicious masses, significant calcifications or other abnormalities are seen.   BI-RADS 1: Negative.     19, Screening MMG with Andrew ([facility]):  There are scattered areas of fibroglandular density. There is focal asymmetry measuring 15 millimeters seen in the central region of the left breast located 6 centimeters from the nipple. This is best seen on the CC view.  In the right breast, no suspicious masses, significant calcifications or other abnormalities are seen.  BI-RADS 0: Incomplete.     19, Left Diagnostic MMG with Andrew & Left Breast US ([facility]).  MM. On the present examination, there is focal asymmetry measuring 16 millimeters with associated very faint calcifications and architectural distortion in the central region of the left breast located 6 centimeters from the nipple. This persists on the additional images and requires further evaluation.  US:  1. There is no evidence of any solid mass or abnormal cystic elements. No sonographic correlate could be identified.  2. There is a small simple cyst measuring 5 millimeters seen in the sub-areolar region of the left breast.   3. There is an area of duct ectasia seen in the sub-areolar region of the left breast.   IMPRESSION:  1. Focal asymmetry in the left breast is suspicious. Stereotactic biopsy is recommended.  2. Simple cyst in the sub-areolar region of the left breast is  benign-negative.  3. Area of duct ectasia in the sub-areolar region of the left breast is benign-negative.  BI-RADS 4: Suspicious.     6/12/19, Left Breast, Stereotactic Biopsy ([facility]):   1. Breast, Left Central, Core Needle Biopsy:  Radial Sclerosing Lesion with Associated Usual Ductal Hyperplasia, Columnar Cell Lesion Without Atypia (Columnar Cell Change/Hyperplasia), Micropapillomas, Apocrine Metaplasia, and Ectatic Ducts/Microcysts.  Microcalcifications Associated with Benign Mammary Ducts/Lobules.        Review of Systems   Constitutional: Negative for appetite change, chills, diaphoresis, fatigue, fever and unexpected weight change.   HENT:   Negative for hearing loss, lump/mass, mouth sores, nosebleeds, sore throat, tinnitus, trouble swallowing and voice change.    Eyes: Negative for eye problems and icterus.   Respiratory: Negative for chest tightness, cough, hemoptysis, shortness of breath and wheezing.    Cardiovascular: Negative for chest pain, leg swelling and palpitations.   Gastrointestinal: Negative for abdominal distention, abdominal pain, blood in stool, constipation, diarrhea, nausea, rectal pain and vomiting.   Endocrine: Negative for hot flashes.   Genitourinary: Negative for bladder incontinence, difficulty urinating, dyspareunia, dysuria, frequency, hematuria, menstrual problem, nocturia, pelvic pain, vaginal bleeding and vaginal discharge.    Musculoskeletal: Negative for arthralgias, back pain, flank pain, gait problem, myalgias, neck pain and neck stiffness.   Skin: Negative for itching, rash and wound.   Neurological: Negative for dizziness, extremity weakness, gait problem, headaches, light-headedness, numbness, seizures and speech difficulty.   Hematological: Negative for adenopathy. Does not bruise/bleed easily.   Psychiatric/Behavioral: Negative for confusion, decreased concentration, depression, sleep disturbance and suicidal ideas. The patient is not nervous/anxious.    I personally  reviewed and updated the ROS.        Medications:  •  MULTIPLE VITAMIN PO, Take  by mouth., Disp: , Rfl:   •  valACYclovir (VALTREX) 500 MG tablet, , Disp: , Rfl:              Allergies   Allergen Reactions   • Benzonatate Rash and Swelling   • Latex Rash   • Sulfa Antibiotics Rash         Medical history:  None        Surgical History         Past Surgical History:   Procedure Laterality Date   • WISDOM TOOTH EXTRACTION                     Family History   Problem Relation Age of Onset   • Melanoma Father           60's   • Melanoma Maternal Grandfather           60's   • Colon cancer Paternal Grandfather           60's         Social History   Social History            Socioeconomic History   • Marital status:        Spouse name: Alvaro   • Number of children: 2   • Years of education: Not on file   • Highest education level: Not on file   Occupational History   • Occupation:        Comment: NII,    Tobacco Use   • Smoking status: Never Smoker   • Smokeless tobacco: Never Used   Substance and Sexual Activity   • Alcohol use: No       Frequency: Never   • Drug use: No   Social History Narrative     1 daughter, 1 son         Patient drinks 2 servings of caffeine per day.        GYNECOLOGIC HISTORY:   . P: 2. AB: 0.  Last menstrual period: 19  Age at menarche: 15  Age at first childbirth: 31  Lactation/How lon month  Age at menopause: premenopausal  Total years of oral contraceptive use: 6  Total years of hormone replacement therapy: none        Physical Exam  /73 (BP Location: Right arm, Patient Position: Lying)   Pulse 91   Temp 99 °F (37.2 °C) (Oral)   Resp 16   Wt 66.8 kg (147 lb 4.3 oz)   LMP 2019 (Exact Date)   SpO2 100%   BMI 23.41 kg/m²   ECOG 0 - Asymptomatic  General: NAD, well appearing  Psych: a&o x 3, normal mood and affect  Eyes: EOMI, no scleral icterus  ENMT: neck supple without masses or thyromegaly, mucus membranes moist  Resp: normal  effort, CTAB  CV: RRR, no murmurs, no edema   GI: soft, NT, ND  MSK: normal gait, normal ROM in bilateral shoulders  Lymph nodes: no cervical, supraclavicular or axillary lymphadenopathy  Breast: symmetric, large size, grade 2 ptosis  Right: No visible abnormalities on inspection while seated, with arms raised or hands on hips. No masses, skin changes, or nipple abnormalities.  Left: No visible abnormalities on inspection while seated, with arms raised or hands on hips. No masses, skin changes, or nipple abnormalities.       I have independently reviewed her imaging and here are my findings:   In the medial/central left breast, there was initially a 1.6 cm asymmetry/area of distortion seen on mammogram.        Assessment:  44 y.o. 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     Plan:  -Left needle-localized excisional biopsy.     Salome Huff MD

## 2019-08-19 NOTE — ANESTHESIA PREPROCEDURE EVALUATION
Anesthesia Evaluation     Patient summary reviewed and Nursing notes reviewed   no history of anesthetic complications:  NPO Solid Status: > 8 hours  NPO Liquid Status: > 2 hours           Airway   Mallampati: II  TM distance: >3 FB  Neck ROM: full  Anterior and Possible difficult intubation  Dental - normal exam     Pulmonary - negative pulmonary ROS and normal exam    breath sounds clear to auscultation  Cardiovascular - normal exam  Exercise tolerance: good (4-7 METS)    Rhythm: regular  Rate: normal        Neuro/Psych- negative ROS  GI/Hepatic/Renal/Endo    (+)  GERD well controlled,      Musculoskeletal (-) negative ROS    Abdominal    Substance History - negative use     OB/GYN negative ob/gyn ROS         Other - negative ROS                       Anesthesia Plan    ASA 2     general     intravenous induction   Anesthetic plan, all risks, benefits, and alternatives have been provided, discussed and informed consent has been obtained with: patient.    Plan discussed with CRNA.

## 2019-08-19 NOTE — ANESTHESIA POSTPROCEDURE EVALUATION
Patient: Jade Reyes    Procedure Summary     Date:  08/19/19 Room / Location:   [facility]    Anesthesia Start:  1223 Anesthesia Stop:  1355    Procedure:  Left breast needle-localized excisional biopsy (Left Breast) Diagnosis:       Abnormal breast biopsy      (Abnormal breast biopsy [R89.7])    Surgeon:  Salome Huff MD Provider:  Duncan Rivero MD    Anesthesia Type:  general ASA Status:  2          Anesthesia Type: general  Last vitals  BP   113/72 (08/19/19 1429)   Temp   36.6 °C (97.8 °F) (08/19/19 1415)   Pulse   64 (08/19/19 1429)   Resp   16 (08/19/19 1429)     SpO2   99 % (08/19/19 1415)     Post Anesthesia Care and Evaluation    Patient location during evaluation: PACU  Patient participation: complete - patient participated  Level of consciousness: awake and alert  Pain management: adequate  Airway patency: patent  Anesthetic complications: No anesthetic complications    Cardiovascular status: acceptable  Respiratory status: acceptable  Hydration status: acceptable    Comments: --------------------            08/19/19 1429     --------------------   BP:       113/72     Pulse:      64       Resp:       16       Temp:                SpO2:               --------------------

## 2019-08-21 LAB
CYTO UR: NORMAL
LAB AP CASE REPORT: NORMAL
LAB AP CLINICAL INFORMATION: NORMAL
LAB AP DIAGNOSIS COMMENT: NORMAL
PATH REPORT.FINAL DX SPEC: NORMAL
PATH REPORT.GROSS SPEC: NORMAL

## 2019-08-22 ENCOUNTER — TELEPHONE (OUTPATIENT)
Dept: SURGERY | Facility: CLINIC | Age: 44
End: 2019-08-22

## 2019-08-22 NOTE — TELEPHONE ENCOUNTER
I called Ms. Reyes to discuss her pathology report. She has a rash from the ChloraPrep and I instructed her to use Zyrtec, Benadryl, and topical hydrocortisone cream. She says that her wound looks fine and otherwise she is recovering well from surgery. I will see her at her scheduled follow-up appointment.     Salome Huff MD

## 2019-08-29 ENCOUNTER — OFFICE VISIT (OUTPATIENT)
Dept: SURGERY | Facility: CLINIC | Age: 44
End: 2019-08-29

## 2019-08-29 VITALS
BODY MASS INDEX: 24.11 KG/M2 | WEIGHT: 150 LBS | DIASTOLIC BLOOD PRESSURE: 62 MMHG | HEIGHT: 66 IN | OXYGEN SATURATION: 99 % | SYSTOLIC BLOOD PRESSURE: 108 MMHG | HEART RATE: 75 BPM

## 2019-08-29 DIAGNOSIS — Z48.89 POSTOPERATIVE VISIT: Primary | ICD-10-CM

## 2019-08-29 PROCEDURE — 99024 POSTOP FOLLOW-UP VISIT: CPT | Performed by: SURGERY

## 2019-09-02 PROBLEM — R89.7 ABNORMAL BREAST BIOPSY: Status: RESOLVED | Noted: 2019-07-03 | Resolved: 2019-09-02

## 2019-12-11 ENCOUNTER — OFFICE VISIT (OUTPATIENT)
Dept: SURGERY | Facility: CLINIC | Age: 44
End: 2019-12-11

## 2019-12-11 VITALS
SYSTOLIC BLOOD PRESSURE: 102 MMHG | HEART RATE: 67 BPM | HEIGHT: 67 IN | WEIGHT: 153 LBS | DIASTOLIC BLOOD PRESSURE: 72 MMHG | OXYGEN SATURATION: 97 % | BODY MASS INDEX: 24.01 KG/M2

## 2019-12-11 DIAGNOSIS — Z98.890 HX OF BREAST SURGERY: Primary | ICD-10-CM

## 2019-12-11 PROCEDURE — 99212 OFFICE O/P EST SF 10 MIN: CPT | Performed by: SURGERY

## 2019-12-11 NOTE — PROGRESS NOTES
[facility]     Referring Provider: Becki Zaldivar MD     Chief complaint: F/u sp excisional biopsy     HPI:   7/3/19:  Ms. Jade Reyes is a 43 yo woman, seen at the request of Dr. Becki Zaldivar, for a new diagnosis of left breast radial sclerosing lesion with micropapillomas after percutaneous biopsy. This was initially detected as an imaging abnormality on routine screening. Her work-up is detailed in the breast history section below. She denies any associated breast lumps, pain, skin changes, or nipple discharge. She denies any prior history of breast problems or breast biopsies. She denies any family history of breast or ovarian cancer.       8/29/19:   She underwent left breast excisional biopsy on 8/19/19. See surgery & pathology details in breast history section below. She initially noticed a rash on her neck and arms, where the chloraprep was on her skin. This has since resolved and otherwise she has been recovering well with no complaints.     12/14/19, Interval History:  She returns today for scheduled follow-up. She denies any new breast-related complaints, specifically she denies any new lumps, pain, skin changes, or nipple discharge. She denies any changes in her family history.       Breast history/imaging (up-to-date as of 12/11/19):    5/4/17, Baseline Screening MMG ([facility]):  There are scattered areas of fibroglandular density.   1. There is a small, oval mass measuring 5 millimeters with obscured margins seen in the middle one-third upper outer region of the right breast located 5 centimeters from the nipple.  2. There is a focal asymmetry measuring 10 millimeters seen in the posterior one-third of the left breast at 9 o’clock, central located 5 centimeters from the nipple.   BI-RADS 0: Incomplete.     5/10/17, Bilateral Diagnostic MMG ([facility]):  1. On the present examination, there is a small oval mass measuring 6 millimeters with circumscribed margins in the middle  one-third upper outer region of the right breast located 5 centimeters from the nipple. Mass has become more defined. This finding is most consistent with an intramammary lymph node.  2. On the present examination, there is an area of asymmetric breast tissue in the posterior one-third of the left asymmetry noted on the recent screening mammogram resolves into stroma on additional views. No suspicious masses, or architectural distortions are identified.  BI-RADS 3: Probably Benign. Follow-up MMG in 6 months is recommended.      11/15/17, Bilateral Diagnostic MMG with Andrew ([facility]):  There are scattered areas of fibroglandular density. There are no suspicious masses, significant calcifications, or other abnormalities seen in either breast. A nonenlarged fat containing intramammary lymph node is again seen in right lateral breast (best visualized on CC views), a benign finding.  BI-RADS 2: Benign.      18, Screening MMG with Andrew ([facility]):  There are scattered areas of fibroglandular density. No suspicious masses, significant calcifications or other abnormalities are seen.   BI-RADS 1: Negative.     19, Screening MMG with Andrew ([facility]):  There are scattered areas of fibroglandular density. There is focal asymmetry measuring 15 millimeters seen in the central region of the left breast located 6 centimeters from the nipple. This is best seen on the CC view.  In the right breast, no suspicious masses, significant calcifications or other abnormalities are seen.  BI-RADS 0: Incomplete.     19, Left Diagnostic MMG with Andrew & Left Breast US ([facility]).  MM. On the present examination, there is focal asymmetry measuring 16 millimeters with associated very faint calcifications and architectural distortion in the central region of the left breast located 6 centimeters from the nipple. This persists on the additional images and requires further evaluation.  US:  1. There is no evidence of any solid  mass or abnormal cystic elements. No sonographic correlate could be identified.  2. There is a small simple cyst measuring 5 millimeters seen in the sub-areolar region of the left breast.   3. There is an area of duct ectasia seen in the sub-areolar region of the left breast.   IMPRESSION:  1. Focal asymmetry in the left breast is suspicious. Stereotactic biopsy is recommended.  2. Simple cyst in the sub-areolar region of the left breast is benign-negative.  3. Area of duct ectasia in the sub-areolar region of the left breast is benign-negative.  BI-RADS 4: Suspicious.     6/12/19, Left Breast, Stereotactic Biopsy ([facility]):   1. Breast, Left Central, Core Needle Biopsy:  Radial Sclerosing Lesion with Associated Usual Ductal Hyperplasia, Columnar Cell Lesion Without Atypia (Columnar Cell Change/Hyperplasia), Micropapillomas, Apocrine Metaplasia, and Ectatic Ducts/Microcysts.  Microcalcifications Associated with Benign Mammary Ducts/Lobules.     8/19/19, Left breast needle-localized excisional biopsy:  1. Left Breast, Oriented Needle Localization Lumpectomy:               A. Benign breast tissue with radial sclerosing lesion, intraductal papilloma and adjacent micropapillomas                    exhibiting usual ductal hyperplasia.               B. Columnar cell hyperplasia.               C. Duct ectasia.               D. Biopsy site identified.                D. No atypical hyperplasia, in situ nor invasive carcinoma identified.              2. Left Breast, New Medial Margin,Oriented Excision:               A. Benign breast tissue with adenosis.               B. No atypical hyperplasia, in situ, nor invasive carcinoma identified.       Review of Systems:  See interval history.       Medications:    Current Outpatient Medications:   •  MULTIPLE VITAMIN PO, Take 1 tablet by mouth Daily. PT HOLDING FOR SURGERY, Disp: , Rfl:   •  valACYclovir (VALTREX) 500 MG tablet, Take 500 mg by mouth Daily., Disp: , Rfl:       Allergies    Allergen Reactions   • Benzonatate Rash and Swelling   • Chlorhexidine Gluconate Rash     Chloraprep    • Latex Rash   • Sulfa Antibiotics Rash       Family History   Problem Relation Age of Onset   • Melanoma Father         60's   • Melanoma Maternal Grandfather         60's   • Colon cancer Paternal Grandfather         60's   • Malig Hyperthermia Neg Hx        PHYSICAL EXAMINATION:   Vitals:    12/11/19 0758   BP: 102/72   Pulse: 67   SpO2: 97%     ECOG 0 - Asymptomatic  General: NAD, well appearing  Psych: a&o x 3, normal mood and affect  Eyes: EOMI, no scleral icterus  ENMT: neck supple without masses or thyromegaly, mucus membranes moist  MSK: normal gait, normal ROM in bilateral shoulders  Lymph nodes: no cervical, supraclavicular or axillary lymphadenopathy  Breast: symmetric, large size, grade 2 ptosis  Right: No visible abnormalities on inspection while seated, with arms raised or hands on hips. No masses, skin changes, or nipple abnormalities.  Left: Well-healed medial periareolar incision, otherwise no visible abnormalities on inspection while seated, with arms raised or hands on hips. Scar is soft. No concavity or fullness at the surgical site. No masses or nipple abnormalities.      Assessment:  44 y.o. F sp left breast excisional biopsy on 8/19/19 for a radial sclerosing lesion with micropapillomas with benign final pathology.    Plan:  -F/u with me as necessary. She will be due for screening mammogram in 5/2020.  -She was instructed to call with any questions, concerns or changes on BSE.    Salome Huff MD      CC:  MD Anjelica Mosqueda MD Mollie Cartwright,

## 2020-05-26 ENCOUNTER — APPOINTMENT (OUTPATIENT)
Dept: WOMENS IMAGING | Facility: HOSPITAL | Age: 45
End: 2020-05-26

## 2020-05-26 PROCEDURE — 77067 SCR MAMMO BI INCL CAD: CPT | Performed by: RADIOLOGY

## 2020-05-26 PROCEDURE — 77063 BREAST TOMOSYNTHESIS BI: CPT | Performed by: RADIOLOGY

## 2021-04-06 ENCOUNTER — BULK ORDERING (OUTPATIENT)
Dept: CASE MANAGEMENT | Facility: OTHER | Age: 46
End: 2021-04-06

## 2021-04-06 DIAGNOSIS — Z23 IMMUNIZATION DUE: ICD-10-CM

## 2021-05-27 ENCOUNTER — APPOINTMENT (OUTPATIENT)
Dept: WOMENS IMAGING | Facility: HOSPITAL | Age: 46
End: 2021-05-27

## 2021-05-27 PROCEDURE — 77067 SCR MAMMO BI INCL CAD: CPT | Performed by: RADIOLOGY

## 2021-05-27 PROCEDURE — 77063 BREAST TOMOSYNTHESIS BI: CPT | Performed by: RADIOLOGY

## 2021-06-23 ENCOUNTER — APPOINTMENT (OUTPATIENT)
Dept: WOMENS IMAGING | Facility: HOSPITAL | Age: 46
End: 2021-06-23

## 2021-06-23 PROCEDURE — G0279 TOMOSYNTHESIS, MAMMO: HCPCS | Performed by: RADIOLOGY

## 2021-06-23 PROCEDURE — 77061 BREAST TOMOSYNTHESIS UNI: CPT | Performed by: RADIOLOGY

## 2021-06-23 PROCEDURE — 77065 DX MAMMO INCL CAD UNI: CPT | Performed by: RADIOLOGY

## 2021-06-23 PROCEDURE — 76641 ULTRASOUND BREAST COMPLETE: CPT | Performed by: RADIOLOGY

## 2021-07-13 ENCOUNTER — APPOINTMENT (OUTPATIENT)
Dept: WOMENS IMAGING | Facility: HOSPITAL | Age: 46
End: 2021-07-13

## 2021-07-13 PROCEDURE — 76942 ECHO GUIDE FOR BIOPSY: CPT | Performed by: RADIOLOGY

## 2021-07-13 PROCEDURE — 19000 PUNCTURE ASPIR CYST BREAST: CPT | Performed by: RADIOLOGY

## 2022-06-02 ENCOUNTER — APPOINTMENT (OUTPATIENT)
Dept: WOMENS IMAGING | Facility: HOSPITAL | Age: 47
End: 2022-06-02

## 2022-06-02 PROCEDURE — 77063 BREAST TOMOSYNTHESIS BI: CPT | Performed by: RADIOLOGY

## 2022-06-02 PROCEDURE — 77067 SCR MAMMO BI INCL CAD: CPT | Performed by: RADIOLOGY

## 2025-07-18 ENCOUNTER — APPOINTMENT (OUTPATIENT)
Dept: WOMENS IMAGING | Facility: HOSPITAL | Age: 50
End: 2025-07-18
Payer: COMMERCIAL

## 2025-07-18 PROCEDURE — 76642 ULTRASOUND BREAST LIMITED: CPT | Performed by: RADIOLOGY

## (undated) DEVICE — GLV SURG SENSICARE MICRO PF LF 6.5 STRL

## (undated) DEVICE — IRRIGATOR BULB ASEPTO 60CC STRL

## (undated) DEVICE — ADHS SKIN DERMABOND TOP ADVANCED

## (undated) DEVICE — TOWEL,OR,DSP,ST,BLUE,STD,4/PK,20PK/CS: Brand: MEDLINE

## (undated) DEVICE — NDL HYPO ECLPS SFTY 22G 1 1/2IN

## (undated) DEVICE — ANTIBACTERIAL UNDYED BRAIDED (POLYGLACTIN 910), SYNTHETIC ABSORBABLE SUTURE: Brand: COATED VICRYL

## (undated) DEVICE — APPL CHLORAPREP W/TINT 26ML ORNG

## (undated) DEVICE — ELECTRD BLD EDGE/INSUL1P 2.4X5.1MM STRL

## (undated) DEVICE — CVR PROB GEN PURP W ISOSILK 6X48

## (undated) DEVICE — SUT SILK 2/0 SH 30IN K833H

## (undated) DEVICE — PK PROC MINOR TOWER 40

## (undated) DEVICE — LEGGINGS, PAIR, 29X43, STERILE: Brand: MEDLINE

## (undated) DEVICE — SUT MNCRYL 4/0 PS2 18 IN

## (undated) DEVICE — DRAPE,CHEST,FENES,15X10,STERIL: Brand: MEDLINE

## (undated) DEVICE — GLV SURG SENSICARE POLYISPRN W/ALOE PF LF 6.5 GRN STRL